# Patient Record
Sex: FEMALE | Race: OTHER
[De-identification: names, ages, dates, MRNs, and addresses within clinical notes are randomized per-mention and may not be internally consistent; named-entity substitution may affect disease eponyms.]

---

## 2021-05-26 PROBLEM — Z00.00 ENCOUNTER FOR PREVENTIVE HEALTH EXAMINATION: Status: ACTIVE | Noted: 2021-05-26

## 2021-06-02 ENCOUNTER — NON-APPOINTMENT (OUTPATIENT)
Age: 41
End: 2021-06-02

## 2021-06-02 ENCOUNTER — APPOINTMENT (OUTPATIENT)
Dept: PODIATRY | Facility: CLINIC | Age: 41
End: 2021-06-02
Payer: COMMERCIAL

## 2021-06-02 VITALS — BODY MASS INDEX: 21.66 KG/M2 | WEIGHT: 130 LBS | HEIGHT: 65 IN

## 2021-06-02 DIAGNOSIS — Z78.9 OTHER SPECIFIED HEALTH STATUS: ICD-10-CM

## 2021-06-02 DIAGNOSIS — T14.8XXA OTHER INJURY OF UNSPECIFIED BODY REGION, INITIAL ENCOUNTER: ICD-10-CM

## 2021-06-02 DIAGNOSIS — S92.425A NONDISPLACED FRACTURE OF DISTAL PHALANX OF LEFT GREAT TOE, INITIAL ENCOUNTER FOR CLOSED FRACTURE: ICD-10-CM

## 2021-06-02 PROCEDURE — 28495 TREAT BIG TOE FRACTURE: CPT

## 2021-06-02 PROCEDURE — S0630: CPT

## 2021-06-02 PROCEDURE — 99203 OFFICE O/P NEW LOW 30 MIN: CPT | Mod: 25

## 2021-06-02 PROCEDURE — 99072 ADDL SUPL MATRL&STAF TM PHE: CPT

## 2021-06-02 NOTE — PROCEDURE
[FreeTextEntry1] : I have reviewed the xrays that were taken outside this office and discussed my findings, opinions and recommendations to the patient\par \par Under sterile and aseptic technique sutures were removed after a Betadine prep of the area Steri-Strips applied and a dry sterile bandage applied. Discharge instructions revealed maintaining the proper postoperative shoe gear and advice stressed\par I have reviewed the care orders with the patient they understand and they also understands the long-term consequences of not following my wound care orders as well  precautions I have recommended  and shoe gear advice I have mentioned.\par I have advised the patient that they may return to normal activity level but to limit it to tolerance. I advised them at this time that they do not need any assistive device special shoe bandaging or bracing. I also advised that should they be doing her normal daily function and they should experience some pain that upon finishing that they should return to anti-inflammatory medication over-the-counter if possible, ice and elevation. If this continues more than 24-48 hours he should contact the office immediately for followup appointment\par A complete and thorough evaluation of the type of shoes they should be wearing and type of shoes for this time of year was discussed with patient.\par .taping of toe w/ compression\par \par \par follow up prn\par \par

## 2021-06-02 NOTE — PHYSICAL EXAM
[General Appearance - Alert] : alert [General Appearance - In No Acute Distress] : in no acute distress [2+] : left foot dorsalis pedis 2+ [Skin Color & Pigmentation] : normal skin color and pigmentation [Skin Turgor] : normal skin turgor [] : no rash [Sensation] : the sensory exam was normal to light touch and pinprick [No Focal Deficits] : no focal deficits [Deep Tendon Reflexes (DTR)] : deep tendon reflexes were 2+ and symmetric [Motor Exam] : the motor exam was normal [Oriented To Time, Place, And Person] : oriented to person, place, and time [Impaired Insight] : insight and judgment were intact [Affect] : the affect was normal [Delayed in the Right Toes] : capillary refills normal in right toes [Delayed in the Left Toes] : capillary refills normal in the left toes [de-identified] : comminuted fx distal phalanx left GT [Foot Ulcer] : no foot ulcer [Skin Induration] : no skin induration [FreeTextEntry1] : healed lacerations w/ 3 sutures intact\par no cellulitis, no tunneling, no sinus tract, no drainage, no odor, mild periwound erythema, no evidence of infection\par

## 2021-06-02 NOTE — REASON FOR VISIT
[Initial Visit] : an initial visit for [Confirmed Foot Fracture] : confirmed foot fracture [FreeTextEntry2] : left

## 2021-06-02 NOTE — HISTORY OF PRESENT ILLNESS
[FreeTextEntry1] : Location: distal tuft of left GT\par Duration:2 weeks\par Acute: yes\par Past Tx:ED, xrays, suturing of laceration\par Exacerbated by: WB\par Prior Hx:no\par

## 2021-11-28 ENCOUNTER — RESULT REVIEW (OUTPATIENT)
Age: 41
End: 2021-11-28

## 2021-12-21 ENCOUNTER — TRANSCRIPTION ENCOUNTER (OUTPATIENT)
Age: 41
End: 2021-12-21